# Patient Record
Sex: MALE | Race: WHITE | Employment: OTHER | URBAN - METROPOLITAN AREA
[De-identification: names, ages, dates, MRNs, and addresses within clinical notes are randomized per-mention and may not be internally consistent; named-entity substitution may affect disease eponyms.]

---

## 2023-11-28 ENCOUNTER — HOSPITAL ENCOUNTER (INPATIENT)
Age: 83
LOS: 2 days | Discharge: STILL ADMITTED OR ADMINISTRATIVE | DRG: 309 | End: 2023-11-30
Attending: EMERGENCY MEDICINE | Admitting: PSYCHIATRY & NEUROLOGY
Payer: MEDICARE

## 2023-11-28 DIAGNOSIS — R45.851 SUICIDAL IDEATION: Primary | ICD-10-CM

## 2023-11-28 DIAGNOSIS — I48.91 ATRIAL FIBRILLATION, UNSPECIFIED TYPE (HCC): ICD-10-CM

## 2023-11-28 PROBLEM — F32.A DEPRESSION, UNSPECIFIED: Status: ACTIVE | Noted: 2023-11-28

## 2023-11-28 LAB
AMPHETAMINES UR QL SCN>1000 NG/ML: NORMAL
ANION GAP SERPL CALCULATED.3IONS-SCNC: 12 MMOL/L (ref 3–16)
APAP SERPL-MCNC: <5 UG/ML (ref 10–30)
BARBITURATES UR QL SCN>200 NG/ML: NORMAL
BASOPHILS # BLD: 0.1 K/UL (ref 0–0.2)
BASOPHILS NFR BLD: 0.7 %
BENZODIAZ UR QL SCN>200 NG/ML: NORMAL
BUN SERPL-MCNC: 22 MG/DL (ref 7–20)
CALCIUM SERPL-MCNC: 9.3 MG/DL (ref 8.3–10.6)
CANNABINOIDS UR QL SCN>50 NG/ML: NORMAL
CHLORIDE SERPL-SCNC: 102 MMOL/L (ref 99–110)
CO2 SERPL-SCNC: 23 MMOL/L (ref 21–32)
COCAINE UR QL SCN: NORMAL
CREAT SERPL-MCNC: 1.3 MG/DL (ref 0.8–1.3)
DEPRECATED RDW RBC AUTO: 14.4 % (ref 12.4–15.4)
DRUG SCREEN COMMENT UR-IMP: NORMAL
EKG ATRIAL RATE: 119 BPM
EKG DIAGNOSIS: NORMAL
EKG Q-T INTERVAL: 322 MS
EKG QRS DURATION: 82 MS
EKG QTC CALCULATION (BAZETT): 427 MS
EKG R AXIS: -61 DEGREES
EKG T AXIS: 12 DEGREES
EKG VENTRICULAR RATE: 106 BPM
EOSINOPHIL # BLD: 0.1 K/UL (ref 0–0.6)
EOSINOPHIL NFR BLD: 1.4 %
ETHANOLAMINE SERPL-MCNC: NORMAL MG/DL (ref 0–0.08)
FENTANYL SCREEN, URINE: NORMAL
GFR SERPLBLD CREATININE-BSD FMLA CKD-EPI: 55 ML/MIN/{1.73_M2}
GLUCOSE SERPL-MCNC: 126 MG/DL (ref 70–99)
HCT VFR BLD AUTO: 48.1 % (ref 40.5–52.5)
HGB BLD-MCNC: 15.9 G/DL (ref 13.5–17.5)
INR PPP: 1.52 (ref 0.84–1.16)
LYMPHOCYTES # BLD: 1.6 K/UL (ref 1–5.1)
LYMPHOCYTES NFR BLD: 18.5 %
MAGNESIUM SERPL-MCNC: 1.8 MG/DL (ref 1.8–2.4)
MCH RBC QN AUTO: 31.4 PG (ref 26–34)
MCHC RBC AUTO-ENTMCNC: 33.1 G/DL (ref 31–36)
MCV RBC AUTO: 94.7 FL (ref 80–100)
METHADONE UR QL SCN>300 NG/ML: NORMAL
MONOCYTES # BLD: 1 K/UL (ref 0–1.3)
MONOCYTES NFR BLD: 11.2 %
NEUTROPHILS # BLD: 6 K/UL (ref 1.7–7.7)
NEUTROPHILS NFR BLD: 68.2 %
OPIATES UR QL SCN>300 NG/ML: NORMAL
OXYCODONE UR QL SCN: NORMAL
PCP UR QL SCN>25 NG/ML: NORMAL
PH UR STRIP: 5 [PH]
PLATELET # BLD AUTO: 213 K/UL (ref 135–450)
PMV BLD AUTO: 8.7 FL (ref 5–10.5)
POTASSIUM SERPL-SCNC: 3.4 MMOL/L (ref 3.5–5.1)
PROTHROMBIN TIME: 18.3 SEC (ref 11.5–14.8)
RBC # BLD AUTO: 5.08 M/UL (ref 4.2–5.9)
SALICYLATES SERPL-MCNC: <0.3 MG/DL (ref 15–30)
SODIUM SERPL-SCNC: 137 MMOL/L (ref 136–145)
WBC # BLD AUTO: 8.8 K/UL (ref 4–11)

## 2023-11-28 PROCEDURE — 80179 DRUG ASSAY SALICYLATE: CPT

## 2023-11-28 PROCEDURE — 93010 ELECTROCARDIOGRAM REPORT: CPT | Performed by: INTERNAL MEDICINE

## 2023-11-28 PROCEDURE — 82077 ASSAY SPEC XCP UR&BREATH IA: CPT

## 2023-11-28 PROCEDURE — 93005 ELECTROCARDIOGRAM TRACING: CPT | Performed by: EMERGENCY MEDICINE

## 2023-11-28 PROCEDURE — 99285 EMERGENCY DEPT VISIT HI MDM: CPT

## 2023-11-28 PROCEDURE — 6370000000 HC RX 637 (ALT 250 FOR IP): Performed by: PSYCHIATRY & NEUROLOGY

## 2023-11-28 PROCEDURE — 36415 COLL VENOUS BLD VENIPUNCTURE: CPT

## 2023-11-28 PROCEDURE — 83735 ASSAY OF MAGNESIUM: CPT

## 2023-11-28 PROCEDURE — 80143 DRUG ASSAY ACETAMINOPHEN: CPT

## 2023-11-28 PROCEDURE — 85025 COMPLETE CBC W/AUTO DIFF WBC: CPT

## 2023-11-28 PROCEDURE — 1240000000 HC EMOTIONAL WELLNESS R&B

## 2023-11-28 PROCEDURE — 93005 ELECTROCARDIOGRAM TRACING: CPT | Performed by: PSYCHIATRY & NEUROLOGY

## 2023-11-28 PROCEDURE — 80307 DRUG TEST PRSMV CHEM ANLYZR: CPT

## 2023-11-28 PROCEDURE — 80048 BASIC METABOLIC PNL TOTAL CA: CPT

## 2023-11-28 PROCEDURE — 85610 PROTHROMBIN TIME: CPT

## 2023-11-28 RX ORDER — ACETAMINOPHEN 325 MG/1
650 TABLET ORAL EVERY 8 HOURS PRN
Status: DISCONTINUED | OUTPATIENT
Start: 2023-11-28 | End: 2023-11-30 | Stop reason: HOSPADM

## 2023-11-28 RX ORDER — LISINOPRIL 20 MG/1
40 TABLET ORAL DAILY
Status: DISCONTINUED | OUTPATIENT
Start: 2023-11-29 | End: 2023-11-30 | Stop reason: HOSPADM

## 2023-11-28 RX ORDER — TRAZODONE HYDROCHLORIDE 50 MG/1
50 TABLET ORAL NIGHTLY PRN
Status: DISCONTINUED | OUTPATIENT
Start: 2023-11-28 | End: 2023-11-30 | Stop reason: HOSPADM

## 2023-11-28 RX ORDER — WARFARIN SODIUM 5 MG/1
5 TABLET ORAL
COMMUNITY

## 2023-11-28 RX ORDER — METOPROLOL SUCCINATE 25 MG/1
25 TABLET, EXTENDED RELEASE ORAL DAILY
Status: DISCONTINUED | OUTPATIENT
Start: 2023-11-28 | End: 2023-11-28 | Stop reason: ALTCHOICE

## 2023-11-28 RX ORDER — METOPROLOL SUCCINATE 25 MG/1
25 TABLET, EXTENDED RELEASE ORAL DAILY
Status: ON HOLD | COMMUNITY
End: 2023-11-28 | Stop reason: ALTCHOICE

## 2023-11-28 RX ORDER — POLYETHYLENE GLYCOL 3350 17 G
2 POWDER IN PACKET (EA) ORAL
Status: DISCONTINUED | OUTPATIENT
Start: 2023-11-28 | End: 2023-11-30 | Stop reason: HOSPADM

## 2023-11-28 RX ORDER — BENAZEPRIL HYDROCHLORIDE 40 MG/1
40 TABLET, FILM COATED ORAL DAILY
Status: ON HOLD | COMMUNITY
End: 2023-12-01 | Stop reason: SDUPTHER

## 2023-11-28 RX ORDER — HYDROCHLOROTHIAZIDE 25 MG/1
25 TABLET ORAL DAILY
Status: ON HOLD | COMMUNITY
End: 2023-12-01 | Stop reason: HOSPADM

## 2023-11-28 RX ORDER — WARFARIN SODIUM 2.5 MG/1
7.5 TABLET ORAL
Status: COMPLETED | OUTPATIENT
Start: 2023-11-28 | End: 2023-11-29

## 2023-11-28 RX ORDER — WARFARIN SODIUM 2.5 MG/1
5 TABLET ORAL DAILY
Status: DISCONTINUED | OUTPATIENT
Start: 2023-11-28 | End: 2023-11-28 | Stop reason: ALTCHOICE

## 2023-11-28 RX ORDER — HYDROCHLOROTHIAZIDE 25 MG/1
25 TABLET ORAL DAILY
Status: DISCONTINUED | OUTPATIENT
Start: 2023-11-29 | End: 2023-11-30 | Stop reason: HOSPADM

## 2023-11-28 RX ORDER — HYDROXYZINE HYDROCHLORIDE 25 MG/1
25 TABLET, FILM COATED ORAL 3 TIMES DAILY PRN
Status: DISCONTINUED | OUTPATIENT
Start: 2023-11-28 | End: 2023-11-30 | Stop reason: HOSPADM

## 2023-11-28 RX ADMIN — METOPROLOL TARTRATE 25 MG: 25 TABLET, FILM COATED ORAL at 22:53

## 2023-11-28 ASSESSMENT — LIFESTYLE VARIABLES
HOW OFTEN DO YOU HAVE A DRINK CONTAINING ALCOHOL: NEVER
HOW MANY STANDARD DRINKS CONTAINING ALCOHOL DO YOU HAVE ON A TYPICAL DAY: PATIENT DOES NOT DRINK

## 2023-11-28 ASSESSMENT — PATIENT HEALTH QUESTIONNAIRE - PHQ9
10. IF YOU CHECKED OFF ANY PROBLEMS, HOW DIFFICULT HAVE THESE PROBLEMS MADE IT FOR YOU TO DO YOUR WORK, TAKE CARE OF THINGS AT HOME, OR GET ALONG WITH OTHER PEOPLE: 2
5. POOR APPETITE OR OVEREATING: 3
1. LITTLE INTEREST OR PLEASURE IN DOING THINGS: 2
9. THOUGHTS THAT YOU WOULD BE BETTER OFF DEAD, OR OF HURTING YOURSELF: 1
SUM OF ALL RESPONSES TO PHQ9 QUESTIONS 1 & 2: 4
SUM OF ALL RESPONSES TO PHQ QUESTIONS 1-9: 10
SUM OF ALL RESPONSES TO PHQ QUESTIONS 1-9: 11
SUM OF ALL RESPONSES TO PHQ QUESTIONS 1-9: 11
3. TROUBLE FALLING OR STAYING ASLEEP: 0
6. FEELING BAD ABOUT YOURSELF - OR THAT YOU ARE A FAILURE OR HAVE LET YOURSELF OR YOUR FAMILY DOWN: 3
2. FEELING DOWN, DEPRESSED OR HOPELESS: 2
8. MOVING OR SPEAKING SO SLOWLY THAT OTHER PEOPLE COULD HAVE NOTICED. OR THE OPPOSITE, BEING SO FIGETY OR RESTLESS THAT YOU HAVE BEEN MOVING AROUND A LOT MORE THAN USUAL: 0
SUM OF ALL RESPONSES TO PHQ QUESTIONS 1-9: 11
7. TROUBLE CONCENTRATING ON THINGS, SUCH AS READING THE NEWSPAPER OR WATCHING TELEVISION: 0
4. FEELING TIRED OR HAVING LITTLE ENERGY: 0

## 2023-11-28 ASSESSMENT — SLEEP AND FATIGUE QUESTIONNAIRES
DO YOU USE A SLEEP AID: YES
SLEEP PATTERN: DIFFICULTY FALLING ASLEEP
DO YOU HAVE DIFFICULTY SLEEPING: NO
DO YOU HAVE DIFFICULTY SLEEPING: NO
AVERAGE NUMBER OF SLEEP HOURS: 9
DO YOU USE A SLEEP AID: NO
AVERAGE NUMBER OF SLEEP HOURS: 10

## 2023-11-28 ASSESSMENT — PAIN - FUNCTIONAL ASSESSMENT: PAIN_FUNCTIONAL_ASSESSMENT: NONE - DENIES PAIN

## 2023-11-28 NOTE — ED PROVIDER NOTES
[facility] Emergency Department      CHIEF COMPLAINT  Suicidal (Patient arrives via police for statements of self harm, he left his residence after an argument with his grandson. Patient apologetic and calm during triage, states he is sorry for causing any trouble. Drove to [place] and wanted to be with his dead spouse, no longer having those thoughts. Corey any drugs/alcohol or medical complaints.)      HISTORY OF PRESENT ILLNESS  Nadia Jenkins is a 82 y.o. male with a history of hypertension and atrial fibrillation presents after he left his house on the  and started driving. He states that he is feeling very depressed. His wife  6 years ago and he states he is \"still not over it\". He states he is out of money and is very stressed out. His daughter and grandson live with him. He states he left his house and forgot his cell phone and decided to just start driving. He felt like if he just left and never came back that would solve all of his problems. He was having thoughts of suicide and thought about just driving into the river. He drove around for 5 days and slept in his car paying for gas with just cash. His family actually issued a missing person report. He states he realized he had made a poor decision and was running out of money so he decided to drive home today. When he walked into his house his family immediately called 911 for him to be brought to the ER. Fatuma Cook No other complaints, modifying factors or associated symptoms. History obtained from the.    I have reviewed the following from the nursing documentation. Past Medical History:   Diagnosis Date    Cancer     Hypertension      Past Surgical History:   Procedure Laterality Date    SHOULDER SURGERY      TOTAL HIP ARTHROPLASTY       No family history on file. Social History     Socioeconomic History    Marital status:       Spouse name: Not on file    Number of children: Not on file    Years of education:

## 2023-11-28 NOTE — ED NOTES
Patient in bed with eyes closed, respirations are easy and even. Sitter at bedside. Will continue to monitor.      Layla Yates RN  11/28/23 [id number]

## 2023-11-28 NOTE — BH NOTE
Pt arrived to the unit via w/c accompanied by security and staff. Pt came in gown only. Personal belongings and SOB given to staff.

## 2023-11-28 NOTE — ED NOTES
Collateral Contact:  Name:Sandeep Kahn Last   PIKWT:[phone]  Relation to Patient: grandson   Last Contact with Patient: today 2023  Concerns: Vannessa Calderon reports pt says he wants to die all of the time. He reports since his grandma  in  pt has not been the same. He reports he and his mom have been with pt since his grandma . He reports pt can be hateful and takes it out on them. He reports he and pt do bicker about money and will argue. He reports pt is under a lot of pressure financially. He reports the way pt acts and the things he says has been getting hard on the family. He reports his mom is disabled as well. He reports pt does not want to live without his wife. He reports around halftime of the [employer] game on  pt left and before he left heard him say \" Can't find my phone, oh well fuck it\". He reports he thought pt would be back shortly after since he usually will leave to go to the race track or to the cemetery and visit his wife and family. He reports it has been a few hours on  and noticed he had not returned and started getting worried. He reports a friend took him up to the police station around 9:55/7:12 pm on  night and filed a missing person's report. He reports he is not sure how much an in-pt admission would help pt. He reports pt does not open up and states he would only give about 60 %. He reports pt will say what the doctors want to hear so he can be discharged. He reports pt will often get angry before he talks to someone. He reports he believes pt left on an impulse. He reports pt has not been the same since his grandma  and states she was the glue that held everyone together. Vannessa Calderon reports he will remove the 22 rifle. He reports he can come get pt if he is discharged.          [facility] [place]

## 2023-11-28 NOTE — ED NOTES
Presenting Problem: Patient presents to ED on a SOB after pt was brought in by police. Per SOB police responded to pt's place of residence in response to a missing person. Per SOB pt had made suicidal statements and told the  he wanted to drive his car into a river. Pt later recanted his statement to police and stated he no linger felt this way. Pt was in possession of a fire arm while he missing but told police officers he threw it into a river while traveling. Pt reports he went out  morning to get money from the [employer] at the [employer] and was going to go home. Pt states while he was out he had a spur of the moment thought and decided to drive off to find a place to kill himself with the firearm. Pt reports he drove all over and ended up in [place] as well. Pt reports he has been depressed since his wife  six years ago. He reports he has not been the same since. He reports  he has financial stress and states he will always help others but no one will help him. He reports he feels he is drowning in debt. He reports his daughter and grandson live with him and states his daughter is paralyzed. He reports he thought by killing himself all of his problems would go away and be solved. He reports he has nothing worth living for and does not enjoy anything he used to do. He reports he does not do much of anything anymore beside go to [employer] and the grocery store. He reports he also left his cell phone at the house when he left. He reports he was being selfish and does not plan on acting on these thoughts again. He reports he does want to die because he wants to be with his wife. He denies a plan or intent. Pt states he will let nature takes it course. He reports he does not want to live long and is surprised he has lived for this long after she has . He reports he did have a firearm with him and was also driving around looking for a place to die.  He reports he could not pull the trigger though. He reports he threw it into a river before he drove back to his house. He reports he is afraid of pain and thinks that is another reason he did not go through with it. He reports at a rest stop he thought he saw his wife sitting in the passenger seat in the car but states when he got in she was not there. Pt denies homicidal ideations. Pt denies audio/visual hallucinations. Appearance/Hygiene:  well-appearing, hospital attire, lying in bed, good grooming, and good hygiene   Motor Behavior: WNL   Attitude: cooperative  Affect: depressed affect   Speech: normal pitch and normal volume  Mood: depressed and within normal limits   Thought Processes: [place]  Perceptions: Absent   Thought content: hopelessness, tangential   Orientation: A&Ox4   Memory: intact  Concentration: Good    Insight/ judgement: impaired judgment and insight       Psychosocial and contextual factors: Pt lives in his own home with his daughter and grandson. Pt's daughter is paralyzed. Pt reports financial stress. Pt reports he has arthritis all over and in his shoulders. Pt reports he is also on blood pressure medicine and blood thinners. He reports his appetite has decreased. He reports his sleep is alright due to the pain medicine he takes at night for his shoulders. He reports if he wakes up in the middle of the night it is hard for him to fall asleep. He reports he has not slept in the last three days due to driving and sleeping in his car. He reports he is a loner and does not like to be around people. Pt reports he does not open up much or talk about his feelings. Pt reports he feels he does not have a support system. C-SSRS flowsheet is  Complete. Psychiatric History (including current outpatient provider and past inpatient admissions): Pt reports he has not been admitted for psych in the past. Pt states he has no out pt mental health services.  He reports he has taken multiple of his pain medication at night in

## 2023-11-28 NOTE — ED NOTES
Reviewed medication with patient, he states that is all he takes but does take a night time medication that he can not remember the name.      Milagro Saha RN  11/28/23 4073

## 2023-11-28 NOTE — ED NOTES
Level of Care Disposition: admit     Patient was seen by ED provider and Nursing/SW staff. The case presented to psychiatric provider on-call . Based on the ED evaluation and information presented to the provider by this writer it is the recommendation of the on call psychiatric provider that inpatient hospitalization is the least restrictive environment for the patient at this time. The patient will be admitted to the inpatient unit.              Troy CASTRO

## 2023-11-28 NOTE — ED NOTES
Writer attempted to call pt's grandson Leonrado Larry for collateral information at [phone]. He did not answer, writer left voice message with number to call back.          Darrell CASTRO

## 2023-11-28 NOTE — ED NOTES
Pt brought back to zone B. Pt changed out into safety gown and belongings placed in locker. Pt oriented to room B2.          Marika CASTRO

## 2023-11-28 NOTE — ED NOTES
Patient resting in bed, sitter at bedside. No needs at this time. Will continue to monitor.      Shelley Martinez RN  11/28/23 1002 [place]       Shelley Martinez RN  11/28/23 1960

## 2023-11-29 PROBLEM — I10 ESSENTIAL HYPERTENSION: Status: ACTIVE | Noted: 2023-11-29

## 2023-11-29 PROBLEM — I48.91 ATRIAL FIBRILLATION WITH RVR (HCC): Status: ACTIVE | Noted: 2023-11-29

## 2023-11-29 PROBLEM — R79.1 SUBTHERAPEUTIC INTERNATIONAL NORMALIZED RATIO (INR): Status: ACTIVE | Noted: 2023-11-29

## 2023-11-29 PROBLEM — N18.30 CKD (CHRONIC KIDNEY DISEASE) STAGE 3, GFR 30-59 ML/MIN (HCC): Status: ACTIVE | Noted: 2022-10-31

## 2023-11-29 PROBLEM — R45.851 SUICIDAL IDEATION: Status: ACTIVE | Noted: 2023-11-29

## 2023-11-29 PROBLEM — I48.21 PERMANENT ATRIAL FIBRILLATION (HCC): Status: ACTIVE | Noted: 2017-04-05

## 2023-11-29 LAB
DEPRECATED RDW RBC AUTO: 14.3 % (ref 12.4–15.4)
EKG ATRIAL RATE: 101 BPM
EKG ATRIAL RATE: 159 BPM
EKG DIAGNOSIS: NORMAL
EKG DIAGNOSIS: NORMAL
EKG Q-T INTERVAL: 278 MS
EKG Q-T INTERVAL: 360 MS
EKG QRS DURATION: 84 MS
EKG QRS DURATION: 94 MS
EKG QTC CALCULATION (BAZETT): 392 MS
EKG QTC CALCULATION (BAZETT): 435 MS
EKG R AXIS: -67 DEGREES
EKG R AXIS: -74 DEGREES
EKG T AXIS: -21 DEGREES
EKG T AXIS: 52 DEGREES
EKG VENTRICULAR RATE: 120 BPM
EKG VENTRICULAR RATE: 88 BPM
HCT VFR BLD AUTO: 48.3 % (ref 40.5–52.5)
HGB BLD-MCNC: 16.1 G/DL (ref 13.5–17.5)
INR PPP: 1.55 (ref 0.84–1.16)
MAGNESIUM SERPL-MCNC: 1.7 MG/DL (ref 1.8–2.4)
MCH RBC QN AUTO: 31.4 PG (ref 26–34)
MCHC RBC AUTO-ENTMCNC: 33.3 G/DL (ref 31–36)
MCV RBC AUTO: 94.3 FL (ref 80–100)
PLATELET # BLD AUTO: 203 K/UL (ref 135–450)
PMV BLD AUTO: 8.8 FL (ref 5–10.5)
POTASSIUM SERPL-SCNC: 3.5 MMOL/L (ref 3.5–5.1)
PROTHROMBIN TIME: 18.5 SEC (ref 11.5–14.8)
RBC # BLD AUTO: 5.12 M/UL (ref 4.2–5.9)
WBC # BLD AUTO: 9.8 K/UL (ref 4–11)

## 2023-11-29 PROCEDURE — 85027 COMPLETE CBC AUTOMATED: CPT

## 2023-11-29 PROCEDURE — 6360000002 HC RX W HCPCS: Performed by: REGISTERED NURSE

## 2023-11-29 PROCEDURE — 84132 ASSAY OF SERUM POTASSIUM: CPT

## 2023-11-29 PROCEDURE — 6370000000 HC RX 637 (ALT 250 FOR IP): Performed by: NURSE PRACTITIONER

## 2023-11-29 PROCEDURE — 1240000000 HC EMOTIONAL WELLNESS R&B

## 2023-11-29 PROCEDURE — 6370000000 HC RX 637 (ALT 250 FOR IP): Performed by: PSYCHIATRY & NEUROLOGY

## 2023-11-29 PROCEDURE — 99223 1ST HOSP IP/OBS HIGH 75: CPT

## 2023-11-29 PROCEDURE — 93010 ELECTROCARDIOGRAM REPORT: CPT | Performed by: INTERNAL MEDICINE

## 2023-11-29 PROCEDURE — 85610 PROTHROMBIN TIME: CPT

## 2023-11-29 PROCEDURE — 6370000000 HC RX 637 (ALT 250 FOR IP)

## 2023-11-29 PROCEDURE — 83735 ASSAY OF MAGNESIUM: CPT

## 2023-11-29 PROCEDURE — 36415 COLL VENOUS BLD VENIPUNCTURE: CPT

## 2023-11-29 PROCEDURE — 93005 ELECTROCARDIOGRAM TRACING: CPT

## 2023-11-29 PROCEDURE — 99222 1ST HOSP IP/OBS MODERATE 55: CPT | Performed by: NURSE PRACTITIONER

## 2023-11-29 RX ORDER — ENOXAPARIN SODIUM 100 MG/ML
1 INJECTION SUBCUTANEOUS 2 TIMES DAILY
Status: DISCONTINUED | OUTPATIENT
Start: 2023-11-29 | End: 2023-11-30 | Stop reason: ALTCHOICE

## 2023-11-29 RX ORDER — LANOLIN ALCOHOL/MO/W.PET/CERES
400 CREAM (GRAM) TOPICAL DAILY
Status: COMPLETED | OUTPATIENT
Start: 2023-11-29 | End: 2023-11-30

## 2023-11-29 RX ORDER — WARFARIN SODIUM 2.5 MG/1
7.5 TABLET ORAL
Status: COMPLETED | OUTPATIENT
Start: 2023-11-29 | End: 2023-11-29

## 2023-11-29 RX ADMIN — HYDROCHLOROTHIAZIDE 25 MG: 25 TABLET ORAL at 09:31

## 2023-11-29 RX ADMIN — ENOXAPARIN SODIUM 80 MG: 100 INJECTION SUBCUTANEOUS at 09:32

## 2023-11-29 RX ADMIN — METOPROLOL TARTRATE 25 MG: 25 TABLET, FILM COATED ORAL at 22:07

## 2023-11-29 RX ADMIN — ACETAMINOPHEN 650 MG: 325 TABLET ORAL at 20:32

## 2023-11-29 RX ADMIN — CAMPHOR (SYNTHETIC), MENTHOL, UNSPECIFIED FORM, AND METHYL SALICYLATE: 40; 100; 300 CREAM TOPICAL at 14:42

## 2023-11-29 RX ADMIN — LISINOPRIL 40 MG: 20 TABLET ORAL at 09:31

## 2023-11-29 RX ADMIN — ENOXAPARIN SODIUM 80 MG: 100 INJECTION SUBCUTANEOUS at 00:49

## 2023-11-29 RX ADMIN — Medication 400 MG: at 13:51

## 2023-11-29 RX ADMIN — WARFARIN SODIUM 7.5 MG: 2.5 TABLET ORAL at 00:23

## 2023-11-29 RX ADMIN — ENOXAPARIN SODIUM 80 MG: 100 INJECTION SUBCUTANEOUS at 22:11

## 2023-11-29 RX ADMIN — METOPROLOL TARTRATE 25 MG: 25 TABLET, FILM COATED ORAL at 09:31

## 2023-11-29 RX ADMIN — WARFARIN SODIUM 7.5 MG: 2.5 TABLET ORAL at 17:54

## 2023-11-29 ASSESSMENT — PAIN DESCRIPTION - ORIENTATION: ORIENTATION: OUTER

## 2023-11-29 ASSESSMENT — PAIN DESCRIPTION - DESCRIPTORS: DESCRIPTORS: SHARP

## 2023-11-29 ASSESSMENT — PAIN DESCRIPTION - LOCATION: LOCATION: ELBOW

## 2023-11-29 NOTE — CONSULTS
Pharmacy Note  Warfarin Consult  Dx: Afib  [id number] Score = 3 (Age x2, HTN)  Goal INR range 2-3   Home Warfarin dose: 5 mg on Mon/Wed/Fri; 2.5 mg on Sun/Tues/Thur/Sat  Also, will begin Lovenox bridge until therapeutic for >2 days per Andrei Barreto NP. Date  INR  Warfarin  11/28              1.52                  7.5 mg    Recommend Warfarin 7.5 mg tonight x1. Daily INR ordered. Rx will continue to manage therapy per consult order.

## 2023-11-29 NOTE — CARE COORDINATION
Psychosocial, PTSD screen, and CSSR - S completed by VANE Wilkerson      Pt went to pull money from [employer] and then decided all his problems would go away if he ended his life. Pt decided to leave and planned to end life but could decide how to do it. Patient states since his wife  he has just wanted to die. Patient he still has thoughts of wanting to die and that the idea of it brings him peace and comfort. No hx of mental health hx. PCP did connect patient to a therapist when wife past away in 2017 but pt did not want to engage. Daughter and grandson live with patient, but they don't always provide the support pt feels he needs. 23 1851   Psychiatric History   Psychiatric history treatment Other  (No hx of mental health hx. PCP did connect patient to a therapist when wife past away in 2017 but pt did not want to engage.)   Are there any medication issues? Yes   Recent Psychological Experiences Other(comment); Turmoil (comment)  (Pt went to pull money from [employer] and then decided all his problems would go away if he ended his life. Pt decided to leave and planned to end life but could decide how to do it.  Patient states since his wife  he has just wanted to die.)   Support System   Support system Adequate   Types of Support System Other (Comment)  (Daughter and grandson live with patient, but they don't always provide the support pt feels he needs.)   Problems in support system Lack of friends/family   Current Living Situation   Home Living Adequate   Living information Lives with others  (Lives with daughter and grandson)   Problems with living situation  No   Lack of basic needs No   SSDI/SSI SSI $5909   Other government assistance Medicare   Problems with environment None reported   Supervised setting None   Relationship problems Yes   Relationship problems due to  Death of spouse/partner  (Patient reports that he is gireving the loss of his wife, who passed in 2017)   Medical and

## 2023-11-29 NOTE — PLAN OF CARE
Problem: Risk for Elopement  Goal: Patient will not exit the unit/facility without proper excort  Outcome: Progressing  Flowsheets (Taken 11/28/2023 2023)  Nursing Interventions for Elopement Risk:   Assist with personal care needs such as toileting, eating, dressing, as needed to reduce the risk of wandering   Collaborate with family members/caregivers to mitigate the elopement risk   Communicate/escalate to charge nurse the risk of elopement   Collaborate with treatment team for nicotine replacement   Communicate/escalate to /other team member the risk of elopement   Make sure patient has all necessary personal care items   Place patient in room far away from exits and stairways     Problem: Self Harm/Suicidality  Goal: Will have no self-injury during hospital stay  Description: INTERVENTIONS:  1. Ensure constant observer at bedside with Q15M safety checks  2. Maintain a safe environment  3. Secure patient belongings  4. Ensure family/visitors adhere to safety recommendations  5. Ensure safety tray has been added to patient's diet order  6. Every shift and PRN: Re-assess suicidal risk via Frequent Screener    Outcome: Progressing     Problem: ABCDS Injury Assessment  Goal: Absence of physical injury  Outcome: Progressing     Problem: Coping  Goal: Pt/Family able to verbalize concerns and demonstrate effective coping strategies  Description: INTERVENTIONS:  1. Assist patient/family to identify coping skills, available support systems and cultural and spiritual values  2. Provide emotional support, including active listening and acknowledgement of concerns of patient and caregivers  3. Reduce environmental stimuli, as able  4. Instruct patient/family in relaxation techniques, as appropriate  5.  Assess for spiritual pain/suffering and initiate Spiritual Care, Psychosocial Clinical Specialist consults as needed  Outcome: Progressing     Problem: Decision Making  Goal: Pt/Family able to effectively

## 2023-11-29 NOTE — CONSULTS
Pharmacy consulted to dose enoxaparin as a bridge to warfarin until INR therapeutic for > 2 days (pharmacy to d/c when appropriate). VTE Prophylaxis Monitoring    Recent Labs     11/28/23  1320   CREATININE 1.3     Recent Labs     11/28/23  1320 11/28/23  2302   HGB 15.9  --    HCT 48.1  --      --    INR  --  1.52*     Estimated Creatinine Clearance: 44 mL/min (based on SCr of 1.3 mg/dL). Wt Readings from Last 1 Encounters:   11/29/23 78.6 kg (173 lb 4.5 oz)       The guidance below is to provide initial recommendations for dosing. If recommended dose does not align well with patient's current clinical picture, communications with the care team will occur to determine most appropriate medication and dose. TABLE 2. ENOXAPARIN TREATMENT DOSING   (Based on 1mg/kg BID for DVT/PE/AFib)   Patient Weight (kg)     50.9 and below .9 151-189.9 190 or greater         Estimated CrCl  (ml/min) 30 or greater Recommend [facility] UFH infusion, apixaban or rivaroxaban 1mg/kg SUBQ BID 1mg/kg SUBQ BID if anti-Xa levels are feasible per institution. Alternatively,  recommend switch to [facility] standardized UFH infusion     Recommend switch to [facility] standardized UFH infusion. 15-29 Recommend [facility] standardized UFH infusion or apixaban 1mg/kg SUBQ daily Recommend switch to [facility] standardized UFH infusion     Less than 15 or Dialysis Recommend switch to [facility] standardized UFH infusion.      [facility] 11/29/2023 12:32 AM

## 2023-11-29 NOTE — PLAN OF CARE
Problem: Risk for Elopement  Goal: Patient will not exit the unit/facility without proper excort  11/29/2023 1001 by Fuentes Del Rosario RN  Outcome: Progressing     Problem: Self Harm/Suicidality  Goal: Will have no self-injury during hospital stay  Description: INTERVENTIONS:  1. Ensure constant observer at bedside with Q15M safety checks  2. Maintain a safe environment  3. Secure patient belongings  4. Ensure family/visitors adhere to safety recommendations  5. Ensure safety tray has been added to patient's diet order  6. Every shift and PRN: Re-assess suicidal risk via Frequent Screener    11/29/2023 1001 by Fuentes Del Rosario RN  Outcome: Progressing    Pt has been mostly isolative to his room. He denies wanting to harm himself on the unit. He has been medication compliant. He does not want to be here and does not feel like he should be admitted on a psychiatric unit. He does feel like he has the, \"right to die. \" He reports that he has been unhappy since his wife passed six years ago. When asked if he had a plan to harm himself if discharged, pt denies. He said he will go back to being, \"miserable. \" Pt reports that he goes to [employer] weekly but does not do any other activities. Pt encouraged to come out into the day room but he declined.  Denies needs currently

## 2023-11-29 NOTE — PROGRESS NOTES
4 Eyes Skin Assessment     The patient is being assessed for  Admission    I agree that 2 RN's have performed a thorough Head to Toe Skin Assessment on the patient. ALL assessment sites listed below have been assessed. Areas assessed for pressure by both nurses: 2  [x]   Head, Face, and Ears   [x]   Shoulders, Back, and Chest  [x]   Arms, Elbows, and Hands   [x]   Coccyx, Sacrum, and Ischum  [x]   Legs, Feet, and Heels                                Skin Assessed Under all Medical Devices by both nurses:  No medical devices present. All Mepilex Borders were peeled back and area peeked at by both nurses:  No: None present. Please list where Mepilex Borders are located:  No Mepilex Borders in place. Does the Patient have Skin Breakdown related to pressure?   No          Fede Prevention initiated:  No   Wound Care Orders initiated:  No      [facility] nurse consulted for Pressure Injury (Stage 3,4, Unstageable, DTI, NWPT, Complex wounds)and New or Established Ostomies:  NA        Nurse 1 eSignature: Electronically signed by Samson Saravia RN on 11/29/23 at 1:08 AM EST    **SHARE this note so that the co-signing nurse is able to place an eSignature**    Nurse 2 eSignature: Electronically signed by Antoine Weathers LPN on 76/88/57 at 1:48 AM EST

## 2023-11-29 NOTE — PROGRESS NOTES
CSSR-S Assessment completed with patient who then scored HIGH RISK. Provider Dr. Mahendra Reno was notified of HIGH RISK score, via telephone at 21:20 on 11/28/23. Were suicide precautions ordered: NO    If not ordered, justification as follows: Patient denies current suicidal thoughts, a plan, intent or means. Feels safe on unit. Agrees to and understands safety plan and agrees to make caregivers aware of any changes. Completed by: Alysha Sorto.  Shanda GAY

## 2023-11-29 NOTE — H&P
INITIAL PSYCHIATRIC HISTORY AND PHYSICAL      Patient name: Celina Zamora  Admit date: 2023  Today's date: 2023           CC:  Depression    HPI:   Patient seen in room on Adult Behavioral Unit. Patient is a 82 y.o. male who presents  to [facility] ED on a SOB after pt was brought in by police. Per SW notes: \"Per SOB police responded to pt's place of residence in response to a missing person. Per SOB pt had made suicidal statements and told the  he wanted to drive his car into a river. Pt later recanted his statement to police and stated he no linger felt this way. Pt was in possession of a fire arm while he missing but told police officers he threw it into a river while traveling. Pt reports he went out  morning to get money from the [employer] at [place] and was going to go home. Pt states while he was out he had a spur of the moment thought and decided to drive off to find a place to kill himself with the firearm. Pt reports he drove all over and ended up in [place] as well. Pt reports he has been depressed since his wife  six years ago. He reports he has not been the same since. He reports  he has financial stress and states he will always help others but no one will help him. He reports he feels he is drowning in debt. He reports his daughter and grandson live with him and states his daughter is paralyzed. He reports he thought by killing himself all of his problems would go away and be solved. He reports he has nothing worth living for and does not enjoy anything he used to do. He reports he does not do much of anything anymore beside go to [employer] and the grocery store. He reports he also left his cell phone at the house when he left. He reports he was being selfish and does not plan on acting on these thoughts again. He reports he does want to die because he wants to be with his wife. He denies a plan or intent. Pt states he will let nature takes it course.  He reports medication risks, benefits, side effects. Obtained informed consent for treatment.      Li Mcqueen, PMHNP-BC

## 2023-11-29 NOTE — PROGRESS NOTES
Behavioral Services  Medicare Certification Upon Admission    I certify that this patient's inpatient psychiatric hospital admission is medically necessary for:    [x] (1) Treatment which could reasonably be expected to improve this patient's condition,       [x] (2) Or for diagnostic study;     AND     [x](2) The inpatient psychiatric services are provided while the individual is under the care of a physician and are included in the individualized plan of care.     Estimated length of stay/service 2-5 days    Plan for post-hospital care outpatient services    Electronically signed by BRENDA Cantrell CNP on 11/29/2023 at 1:53 PM

## 2023-11-29 NOTE — PROGRESS NOTES
Pt alert and oriented x 4. Pleasant, appropriate, and cooperative with assessment and care. Patient scores as High risk Per C-SSRS screening. Pt denies an intent, a plan, or means. States regrets of leaving home and describes self aborted suicidal attempts such as driving into a river or off a mountain, and use of a gun that he \"threw in a river\". Denies current SI/HI, AH/VH. Dr. Seth Morocho aware of such and suicidal orders discontinued. Pt is cooperative,independent of ADLs. Gait steady.

## 2023-11-29 NOTE — H&P
Hospital Medicine History & Physical      PCP: Josh Todd MD    Date of Admission: 11/28/2023    Date of Service: Pt seen/examined on 11/29/23     Chief Complaint:    Chief Complaint   Patient presents with    Suicidal     Patient arrives via police for statements of self harm, he left his residence after an argument with his grandson. Patient apologetic and calm during triage, states he is sorry for causing any trouble. Drove to [place] and wanted to be with his dead spouse, no longer having those thoughts. Corey any drugs/alcohol or medical complaints. History Of Present Illness: The patient is a 82 y.o. male with pmhx as below who presented to [facility] ED for suicidal ideation. Patient was seen and evaluated in the ED by the ED medical provider, patient was medically cleared for admission to [facility]. This note serves as an admission medical H&P. Tobacco use: denies  ETOH use: denies  Illicit drug use: denies    Patient denies any medical complaints     Past Medical History:        Diagnosis Date    A-fib ([address])     CKD (chronic kidney disease)     HLD (hyperlipidemia)     Hypertension     Osteoarthritis     Skin cancer        Past Surgical History:        Procedure Laterality Date    SHOULDER SURGERY      TOTAL HIP ARTHROPLASTY         Medications Prior to Admission:    Prior to Admission medications    Medication Sig Start Date End Date Taking? Authorizing Provider   benazepril (LOTENSIN) 40 MG tablet Take 1 tablet by mouth daily   Yes Victor M Craven MD   hydroCHLOROthiazide (HYDRODIURIL) 25 MG tablet Take 1 tablet by mouth daily   Yes Victor M Craven MD   warfarin (COUMADIN) 5 MG tablet Take 1 tablet by mouth Patient takes 5mg on Mon, Wed and Friday.  Half pill other days   Yes Victor M Craven MD   metoprolol tartrate (LOPRESSOR) 25 MG tablet Take 1 tablet by mouth 2 times daily   Yes Victor M Craven MD       Allergies:  Penicillins    Social

## 2023-11-30 ENCOUNTER — HOSPITAL ENCOUNTER (INPATIENT)
Age: 83
LOS: 1 days | Discharge: HOME | DRG: 309 | End: 2023-12-01
Attending: INTERNAL MEDICINE | Admitting: INTERNAL MEDICINE
Payer: MEDICARE

## 2023-11-30 VITALS
DIASTOLIC BLOOD PRESSURE: 55 MMHG | BODY MASS INDEX: 27.85 KG/M2 | HEIGHT: 66 IN | OXYGEN SATURATION: 98 % | TEMPERATURE: 98.6 F | RESPIRATION RATE: 16 BRPM | HEART RATE: 126 BPM | SYSTOLIC BLOOD PRESSURE: 92 MMHG | WEIGHT: 173.28 LBS

## 2023-11-30 PROBLEM — I10 PRIMARY HYPERTENSION: Status: ACTIVE | Noted: 2023-11-30

## 2023-11-30 LAB
ANION GAP SERPL CALCULATED.3IONS-SCNC: 11 MMOL/L (ref 3–16)
BASOPHILS # BLD: 0 K/UL (ref 0–0.2)
BASOPHILS NFR BLD: 0.4 %
BUN SERPL-MCNC: 23 MG/DL (ref 7–20)
CALCIUM SERPL-MCNC: 9 MG/DL (ref 8.3–10.6)
CHLORIDE SERPL-SCNC: 98 MMOL/L (ref 99–110)
CO2 SERPL-SCNC: 24 MMOL/L (ref 21–32)
CREAT SERPL-MCNC: 1.2 MG/DL (ref 0.8–1.3)
DEPRECATED RDW RBC AUTO: 14 % (ref 12.4–15.4)
DEPRECATED RDW RBC AUTO: 14.4 % (ref 12.4–15.4)
EKG ATRIAL RATE: 90 BPM
EKG DIAGNOSIS: NORMAL
EKG Q-T INTERVAL: 340 MS
EKG QRS DURATION: 86 MS
EKG QTC CALCULATION (BAZETT): 413 MS
EKG R AXIS: -49 DEGREES
EKG T AXIS: -13 DEGREES
EKG VENTRICULAR RATE: 89 BPM
EOSINOPHIL # BLD: 0.1 K/UL (ref 0–0.6)
EOSINOPHIL NFR BLD: 0.7 %
GFR SERPLBLD CREATININE-BSD FMLA CKD-EPI: >60 ML/MIN/{1.73_M2}
GLUCOSE SERPL-MCNC: 118 MG/DL (ref 70–99)
HCT VFR BLD AUTO: 42.8 % (ref 40.5–52.5)
HCT VFR BLD AUTO: 44.3 % (ref 40.5–52.5)
HGB BLD-MCNC: 14.6 G/DL (ref 13.5–17.5)
HGB BLD-MCNC: 14.8 G/DL (ref 13.5–17.5)
INR PPP: 2.15 (ref 0.84–1.16)
LYMPHOCYTES # BLD: 1.6 K/UL (ref 1–5.1)
LYMPHOCYTES NFR BLD: 14.8 %
MAGNESIUM SERPL-MCNC: 1.8 MG/DL (ref 1.8–2.4)
MCH RBC QN AUTO: 32.1 PG (ref 26–34)
MCH RBC QN AUTO: 32.4 PG (ref 26–34)
MCHC RBC AUTO-ENTMCNC: 33.5 G/DL (ref 31–36)
MCHC RBC AUTO-ENTMCNC: 34.2 G/DL (ref 31–36)
MCV RBC AUTO: 95 FL (ref 80–100)
MCV RBC AUTO: 95.7 FL (ref 80–100)
MONOCYTES # BLD: 1.5 K/UL (ref 0–1.3)
MONOCYTES NFR BLD: 13.3 %
NEUTROPHILS # BLD: 7.7 K/UL (ref 1.7–7.7)
NEUTROPHILS NFR BLD: 70.8 %
PLATELET # BLD AUTO: 169 K/UL (ref 135–450)
PLATELET # BLD AUTO: 191 K/UL (ref 135–450)
PMV BLD AUTO: 8.7 FL (ref 5–10.5)
PMV BLD AUTO: 8.7 FL (ref 5–10.5)
POTASSIUM SERPL-SCNC: 3.5 MMOL/L (ref 3.5–5.1)
PROTHROMBIN TIME: 23.9 SEC (ref 11.5–14.8)
RBC # BLD AUTO: 4.51 M/UL (ref 4.2–5.9)
RBC # BLD AUTO: 4.63 M/UL (ref 4.2–5.9)
SODIUM SERPL-SCNC: 133 MMOL/L (ref 136–145)
TROPONIN, HIGH SENSITIVITY: 17 NG/L (ref 0–22)
TSH SERPL DL<=0.005 MIU/L-ACNC: 0.39 UIU/ML (ref 0.27–4.2)
WBC # BLD AUTO: 10.3 K/UL (ref 4–11)
WBC # BLD AUTO: 10.9 K/UL (ref 4–11)

## 2023-11-30 PROCEDURE — 36415 COLL VENOUS BLD VENIPUNCTURE: CPT

## 2023-11-30 PROCEDURE — 6370000000 HC RX 637 (ALT 250 FOR IP): Performed by: NURSE PRACTITIONER

## 2023-11-30 PROCEDURE — 2580000003 HC RX 258

## 2023-11-30 PROCEDURE — 2580000003 HC RX 258: Performed by: NURSE PRACTITIONER

## 2023-11-30 PROCEDURE — 85610 PROTHROMBIN TIME: CPT

## 2023-11-30 PROCEDURE — 2060000000 HC ICU INTERMEDIATE R&B

## 2023-11-30 PROCEDURE — 85027 COMPLETE CBC AUTOMATED: CPT

## 2023-11-30 PROCEDURE — 6370000000 HC RX 637 (ALT 250 FOR IP)

## 2023-11-30 PROCEDURE — 83735 ASSAY OF MAGNESIUM: CPT

## 2023-11-30 PROCEDURE — 99222 1ST HOSP IP/OBS MODERATE 55: CPT | Performed by: NURSE PRACTITIONER

## 2023-11-30 PROCEDURE — 93005 ELECTROCARDIOGRAM TRACING: CPT

## 2023-11-30 PROCEDURE — 80048 BASIC METABOLIC PNL TOTAL CA: CPT

## 2023-11-30 PROCEDURE — 84443 ASSAY THYROID STIM HORMONE: CPT

## 2023-11-30 PROCEDURE — 6370000000 HC RX 637 (ALT 250 FOR IP): Performed by: PSYCHIATRY & NEUROLOGY

## 2023-11-30 PROCEDURE — 93010 ELECTROCARDIOGRAM REPORT: CPT | Performed by: INTERNAL MEDICINE

## 2023-11-30 PROCEDURE — 84484 ASSAY OF TROPONIN QUANT: CPT

## 2023-11-30 PROCEDURE — 99223 1ST HOSP IP/OBS HIGH 75: CPT

## 2023-11-30 PROCEDURE — 85025 COMPLETE CBC W/AUTO DIFF WBC: CPT

## 2023-11-30 PROCEDURE — 99231 SBSQ HOSP IP/OBS SF/LOW 25: CPT

## 2023-11-30 RX ORDER — WARFARIN SODIUM 2.5 MG/1
2.5 TABLET ORAL
Status: CANCELLED | OUTPATIENT
Start: 2023-11-30 | End: 2023-11-30

## 2023-11-30 RX ORDER — ACETAMINOPHEN 325 MG/1
650 TABLET ORAL EVERY 6 HOURS PRN
Status: CANCELLED | OUTPATIENT
Start: 2023-11-30

## 2023-11-30 RX ORDER — ONDANSETRON 2 MG/ML
4 INJECTION INTRAMUSCULAR; INTRAVENOUS EVERY 6 HOURS PRN
Status: CANCELLED | OUTPATIENT
Start: 2023-11-30

## 2023-11-30 RX ORDER — SODIUM CHLORIDE 0.9 % (FLUSH) 0.9 %
5-40 SYRINGE (ML) INJECTION EVERY 12 HOURS SCHEDULED
Status: CANCELLED | OUTPATIENT
Start: 2023-11-30

## 2023-11-30 RX ORDER — ACETAMINOPHEN 325 MG/1
650 TABLET ORAL EVERY 8 HOURS PRN
Status: DISCONTINUED | OUTPATIENT
Start: 2023-11-30 | End: 2023-11-30 | Stop reason: SDUPTHER

## 2023-11-30 RX ORDER — POLYETHYLENE GLYCOL 3350 17 G
2 POWDER IN PACKET (EA) ORAL
Status: CANCELLED | OUTPATIENT
Start: 2023-11-30

## 2023-11-30 RX ORDER — ACETAMINOPHEN 650 MG/1
650 SUPPOSITORY RECTAL EVERY 6 HOURS PRN
Status: DISCONTINUED | OUTPATIENT
Start: 2023-11-30 | End: 2023-12-01 | Stop reason: HOSPADM

## 2023-11-30 RX ORDER — SODIUM CHLORIDE 0.9 % (FLUSH) 0.9 %
5-40 SYRINGE (ML) INJECTION PRN
Status: CANCELLED | OUTPATIENT
Start: 2023-11-30

## 2023-11-30 RX ORDER — SODIUM CHLORIDE 9 MG/ML
INJECTION, SOLUTION INTRAVENOUS PRN
Status: CANCELLED | OUTPATIENT
Start: 2023-11-30

## 2023-11-30 RX ORDER — SODIUM CHLORIDE 0.9 % (FLUSH) 0.9 %
5-40 SYRINGE (ML) INJECTION PRN
Status: DISCONTINUED | OUTPATIENT
Start: 2023-11-30 | End: 2023-12-01 | Stop reason: HOSPADM

## 2023-11-30 RX ORDER — HYDROCHLOROTHIAZIDE 25 MG/1
25 TABLET ORAL DAILY
Status: DISCONTINUED | OUTPATIENT
Start: 2023-12-01 | End: 2023-12-01 | Stop reason: HOSPADM

## 2023-11-30 RX ORDER — HYDROXYZINE HYDROCHLORIDE 25 MG/1
25 TABLET, FILM COATED ORAL 3 TIMES DAILY PRN
Status: CANCELLED | OUTPATIENT
Start: 2023-11-30

## 2023-11-30 RX ORDER — LISINOPRIL 20 MG/1
40 TABLET ORAL DAILY
Status: DISCONTINUED | OUTPATIENT
Start: 2023-12-01 | End: 2023-11-30

## 2023-11-30 RX ORDER — ACETAMINOPHEN 325 MG/1
650 TABLET ORAL EVERY 8 HOURS PRN
Status: CANCELLED | OUTPATIENT
Start: 2023-11-30

## 2023-11-30 RX ORDER — SODIUM CHLORIDE 9 MG/ML
INJECTION, SOLUTION INTRAVENOUS CONTINUOUS
Status: DISCONTINUED | OUTPATIENT
Start: 2023-11-30 | End: 2023-12-01 | Stop reason: HOSPADM

## 2023-11-30 RX ORDER — SODIUM CHLORIDE 9 MG/ML
INJECTION, SOLUTION INTRAVENOUS PRN
Status: DISCONTINUED | OUTPATIENT
Start: 2023-11-30 | End: 2023-12-01 | Stop reason: HOSPADM

## 2023-11-30 RX ORDER — ACETAMINOPHEN 325 MG/1
650 TABLET ORAL EVERY 6 HOURS PRN
Status: DISCONTINUED | OUTPATIENT
Start: 2023-11-30 | End: 2023-12-01 | Stop reason: HOSPADM

## 2023-11-30 RX ORDER — LISINOPRIL 20 MG/1
20 TABLET ORAL DAILY
Status: DISCONTINUED | OUTPATIENT
Start: 2023-12-01 | End: 2023-12-01 | Stop reason: HOSPADM

## 2023-11-30 RX ORDER — ACETAMINOPHEN 650 MG/1
650 SUPPOSITORY RECTAL EVERY 6 HOURS PRN
Status: CANCELLED | OUTPATIENT
Start: 2023-11-30

## 2023-11-30 RX ORDER — POLYETHYLENE GLYCOL 3350 17 G
2 POWDER IN PACKET (EA) ORAL
Status: DISCONTINUED | OUTPATIENT
Start: 2023-11-30 | End: 2023-12-01 | Stop reason: HOSPADM

## 2023-11-30 RX ORDER — HYDROXYZINE HYDROCHLORIDE 25 MG/1
25 TABLET, FILM COATED ORAL 3 TIMES DAILY PRN
Status: DISCONTINUED | OUTPATIENT
Start: 2023-11-30 | End: 2023-12-01 | Stop reason: HOSPADM

## 2023-11-30 RX ORDER — LISINOPRIL 20 MG/1
40 TABLET ORAL DAILY
Status: CANCELLED | OUTPATIENT
Start: 2023-12-01

## 2023-11-30 RX ORDER — WARFARIN SODIUM 2.5 MG/1
2.5 TABLET ORAL
Status: DISCONTINUED | OUTPATIENT
Start: 2023-11-30 | End: 2023-11-30 | Stop reason: HOSPADM

## 2023-11-30 RX ORDER — ONDANSETRON 4 MG/1
4 TABLET, ORALLY DISINTEGRATING ORAL EVERY 8 HOURS PRN
Status: DISCONTINUED | OUTPATIENT
Start: 2023-11-30 | End: 2023-12-01 | Stop reason: HOSPADM

## 2023-11-30 RX ORDER — TRAZODONE HYDROCHLORIDE 50 MG/1
50 TABLET ORAL NIGHTLY PRN
Status: CANCELLED | OUTPATIENT
Start: 2023-11-30

## 2023-11-30 RX ORDER — SODIUM CHLORIDE 0.9 % (FLUSH) 0.9 %
5-40 SYRINGE (ML) INJECTION EVERY 12 HOURS SCHEDULED
Status: DISCONTINUED | OUTPATIENT
Start: 2023-11-30 | End: 2023-12-01 | Stop reason: HOSPADM

## 2023-11-30 RX ORDER — ONDANSETRON 4 MG/1
4 TABLET, ORALLY DISINTEGRATING ORAL EVERY 8 HOURS PRN
Status: CANCELLED | OUTPATIENT
Start: 2023-11-30

## 2023-11-30 RX ORDER — TRAZODONE HYDROCHLORIDE 50 MG/1
50 TABLET ORAL NIGHTLY PRN
Status: DISCONTINUED | OUTPATIENT
Start: 2023-11-30 | End: 2023-12-01 | Stop reason: HOSPADM

## 2023-11-30 RX ORDER — ONDANSETRON 2 MG/ML
4 INJECTION INTRAMUSCULAR; INTRAVENOUS EVERY 6 HOURS PRN
Status: DISCONTINUED | OUTPATIENT
Start: 2023-11-30 | End: 2023-12-01 | Stop reason: HOSPADM

## 2023-11-30 RX ORDER — WARFARIN SODIUM 2.5 MG/1
2.5 TABLET ORAL
Status: COMPLETED | OUTPATIENT
Start: 2023-11-30 | End: 2023-11-30

## 2023-11-30 RX ORDER — POLYETHYLENE GLYCOL 3350 17 G/17G
17 POWDER, FOR SOLUTION ORAL DAILY PRN
Status: CANCELLED | OUTPATIENT
Start: 2023-11-30

## 2023-11-30 RX ORDER — HYDROCHLOROTHIAZIDE 25 MG/1
25 TABLET ORAL DAILY
Status: CANCELLED | OUTPATIENT
Start: 2023-12-01

## 2023-11-30 RX ORDER — POLYETHYLENE GLYCOL 3350 17 G/17G
17 POWDER, FOR SOLUTION ORAL DAILY PRN
Status: DISCONTINUED | OUTPATIENT
Start: 2023-11-30 | End: 2023-12-01 | Stop reason: HOSPADM

## 2023-11-30 RX ADMIN — METOPROLOL TARTRATE 25 MG: 25 TABLET, FILM COATED ORAL at 22:33

## 2023-11-30 RX ADMIN — Medication 10 ML: at 22:33

## 2023-11-30 RX ADMIN — Medication 400 MG: at 08:56

## 2023-11-30 RX ADMIN — SODIUM CHLORIDE: 9 INJECTION, SOLUTION INTRAVENOUS at 14:49

## 2023-11-30 RX ADMIN — METOPROLOL TARTRATE 25 MG: 25 TABLET, FILM COATED ORAL at 08:56

## 2023-11-30 RX ADMIN — HYDROCHLOROTHIAZIDE 25 MG: 25 TABLET ORAL at 08:56

## 2023-11-30 RX ADMIN — ACETAMINOPHEN 650 MG: 325 TABLET ORAL at 15:43

## 2023-11-30 RX ADMIN — ACETAMINOPHEN 650 MG: 325 TABLET ORAL at 08:58

## 2023-11-30 RX ADMIN — WARFARIN SODIUM 2.5 MG: 2.5 TABLET ORAL at 17:54

## 2023-11-30 RX ADMIN — ACETAMINOPHEN 650 MG: 325 TABLET ORAL at 22:33

## 2023-11-30 RX ADMIN — LISINOPRIL 40 MG: 20 TABLET ORAL at 08:57

## 2023-11-30 ASSESSMENT — PAIN SCALES - GENERAL
PAINLEVEL_OUTOF10: 6
PAINLEVEL_OUTOF10: 3
PAINLEVEL_OUTOF10: 0

## 2023-11-30 ASSESSMENT — PAIN DESCRIPTION - ORIENTATION
ORIENTATION: LEFT

## 2023-11-30 ASSESSMENT — ENCOUNTER SYMPTOMS
GASTROINTESTINAL NEGATIVE: 1
RESPIRATORY NEGATIVE: 1

## 2023-11-30 ASSESSMENT — PAIN DESCRIPTION - DESCRIPTORS
DESCRIPTORS: ACHING

## 2023-11-30 ASSESSMENT — PAIN DESCRIPTION - LOCATION
LOCATION: ELBOW

## 2023-11-30 NOTE — PROGRESS NOTES
Pt HR elevated this morning. Perfect serve sent to Maurizio Lin. New order from [facility] CNP to recheck VS in one hour. VS rechecked at 1000. Pt continues to complain of left elbow pain and states he has a bruise on his left ribcage. Pt denies any injury or trauma to the area. [facility] NP aware.      Vitals:    11/30/23 1003   BP: (!) 92/55   Pulse: (!) 126   Resp: 16   Temp:    SpO2: 98%

## 2023-11-30 NOTE — CARE COORDINATION
Pt to DC to [facility] when medically stable. Not seen by CM.  Please alert CM if any DC or DME needs arise

## 2023-11-30 NOTE — PROGRESS NOTES
Pharmacy Note  Warfarin Consult  Dx: Afib  [id number] Score = 3 (Age x2, HTN)  Goal INR range 2-3   Home Warfarin dose: 5 mg on Mon/Wed/Fri; 2.5 mg on Sun/Tues/Thur/Sat  Also, will begin Lovenox bridge until therapeutic for >2 days per Sejal Julio NP. Date                 INR                  Warfarin  11/28              1.52                  7.5 mg  11/29              1.55                  7.5 mg  11/30              2.15                  2.5 mg    Recommend Warfarin 2.5 mg tonight x1. Daily INR ordered. Rx will continue to manage therapy per consult order.   Courtney Tobias, PharmD  11/30/2023 6:48 AM

## 2023-11-30 NOTE — PROGRESS NOTES
[facility]  Discharge Note    Pt discharged with followings belongings:   Dental Appliances: None  Vision - Corrective Lenses: Eyeglasses  Hearing Aid: None  Jewelry: Ring  Body Piercings Removed: No  Clothing: Footwear, Pants, Jacket/Coat, Mel, Sweater, Undergarments, Shirt, Socks  Other Valuables: Other (Comment) (4 Credit Cards)     Mental Status and Behavioral Exam  Normal: No  Level of Assistance: Independent/Self  Facial Expression: Sad  Affect: Appropriate  Level of Consciousness: Alert  Frequency of Checks: 4 times per hour, close  Mood:Normal: No  Mood: Sad  Motor Activity:Normal: No  Motor Activity: Decreased  Eye Contact: Good  Observed Behavior: Cooperative  Sexual Misconduct History: Current - no  Preception: [place] to person, [place] to time, [place] to place, [place] to situation  Attention:Normal: Yes  Thought Processes: Unremarkable  Thought Content:Normal: Yes  Thought Content: Other (comment)  Depression Symptoms: No problems reported or observed. Anxiety Symptoms: Generalized  Obdulia Symptoms: No problems reported or observed.   Hallucinations: None  Delusions: No  Memory:Normal: Yes  Memory: Other (comment)  Insight and Judgment: No  Insight and Judgment: Poor insight, Poor judgment      Metabolic Screening:    No results found for: \"LABA1C\"    No results found for: \"CHOL\"  No results found for: \"TRIG\"  No results found for: \"HDL\"  No components found for: \"LDLCAL\"  No results found for: \"LABVLDL\"    Daly Dorsey RN

## 2023-11-30 NOTE — PROGRESS NOTES
Patient complained of HA, requesting tylenol. Tylenol 650 mg PO given at 1543. Patient resting in bed, eyes closed, relaxed facial expression noted. No further complaints voiced at this time. Call light in reach.

## 2023-11-30 NOTE — PROGRESS NOTES
Pharmacy Note  Warfarin Consult  Dx: afib  Goal INR range 2-3   Home Warfarin dose:5mg MWF and 2.5mg on all other days  Pt transferred to PCU    Date  INR  Warfarin  11/28              1.52                  7.5mg  11/29               1.55                 7.5mg  11/30              2.15                  2.5mg  Recommend Warfarin 2.5mg tonight x1. Daily INR ordered. Rx will continue to manage therapy per consult order.   [address] [place] [address] 11/30/20231:18 PM  .

## 2023-11-30 NOTE — PROGRESS NOTES
11/30/23 1028   Encounter Summary   Encounter Overview/Reason  Volunteer Encounter   Service Provided For: Patient   Last Encounter  11/30/23  ([address] [place] [address])   Rituals, Rites and Sacraments   Type Mu-ism Communion   Assessment/Intervention/Outcome   Intervention Prayer (assurance of)/[place]

## 2023-11-30 NOTE — H&P
Hospital Medicine History & Physical      PCP: Andrew Cherry MD    Date of Admission: 11/30/2023    Date of Service: Pt seen/examined on 11/30/23      Chief Complaint:  afib RVR        History Of Present Illness: The patient is a 82 y.o. male with PMH of atrial fibrillation, CKD, HLD, HTN depression who presents to [facility] from Geriatric Psychiatry for atrial fibrillation with RVR. Pt was on the geriatric psych floor being treated for depression and HR noted to be increased. He did not take his meds, eat or drink well appp 5 days prior to admission to [facility]. Pt transferred to PCU for further care. Past Medical History:        Diagnosis Date    A-fib ([address])     CKD (chronic kidney disease)     HLD (hyperlipidemia)     Hypertension     Osteoarthritis     Skin cancer        Past Surgical History:        Procedure Laterality Date    SHOULDER SURGERY      TOTAL HIP ARTHROPLASTY         Medications Prior to Admission:    Prior to Admission medications    Medication Sig Start Date End Date Taking? Authorizing Provider   benazepril (LOTENSIN) 40 MG tablet Take 1 tablet by mouth daily    Victor M Craven MD   hydroCHLOROthiazide (HYDRODIURIL) 25 MG tablet Take 1 tablet by mouth daily    Victor M Craven MD   warfarin (COUMADIN) 5 MG tablet Take 1 tablet by mouth Patient takes 5mg on Mon, Wed and Friday. Half pill other days    Victor M Craven MD   metoprolol tartrate (LOPRESSOR) 25 MG tablet Take 1 tablet by mouth 2 times daily    Victor M Craven MD       Allergies:  Penicillins    Social History:  The patient currently lives home     TOBACCO:   reports that he has never smoked. He does not have any smokeless tobacco history on file. ETOH:   reports current alcohol use. Family History:   Positive as follows:    No family history on file.     REVIEW OF SYSTEMS:       Constitutional: Negative for fever   HENT: Negative for sore throat   Eyes: Negative for redness

## 2023-11-30 NOTE — PROGRESS NOTES
Telemetry Assignment Communication Form    Patient has orders for continuous telemetry OR pulse oximeter only orders    To be filled out by Clinical    Patient has Admission or Transfer orders and is assigned to Room Number: 328      (Once this top section is completed:  Select \"Route\" and send note to Fax number: [phone]))      ___________________________________________________________________________      To be filled out by [facility]    Patient assigned to tele box number: __________________               (to be written in by [facility] when telemetry box is assigned to patient)    ___________________________________________________________________________      Bedside RN confirming that the box listed above is in fact the telemetry box number being placed on the patient listed above.         X________________________________________ RN signature        __________________________________________RN assigned to Patient (please print)    _______________ Date    ____________ Time

## 2023-11-30 NOTE — PROGRESS NOTES
[facility]  Initial Interdisciplinary Treatment Plan NOTE    Review Date & Time: 11/30/2023 0900    Patient was not in treatment team    Admission Type:   Admission Type:  Involuntary    Reason for admission:  Reason for Admission: Depression      Estimated Length of Stay Update:  3-5 days  Estimated Discharge Date Update:   12/4/2023  EDUCATION:   Learner Progress Toward Treatment Goals: Reviewed results and recommendations of this team    Method: Individual    Outcome: Verbalized understanding    PATIENT GOALS: Get discharged    PLAN/TREATMENT RECOMMENDATIONS UPDATE:Stabilize and treat    GOALS UPDATE:   Time frame for Short-Term Goals: 3 days    Alla Bedolla RN

## 2023-11-30 NOTE — PLAN OF CARE
Problem: Risk for Elopement  Goal: Patient will not exit the unit/facility without proper excort  11/30/2023 1038 by Gee Steven RN  Outcome: Progressing     Problem: Self Harm/Suicidality  Goal: Will have no self-injury during hospital stay  Description: INTERVENTIONS:  1. Ensure constant observer at bedside with Q15M safety checks  2. Maintain a safe environment  3. Secure patient belongings  4. Ensure family/visitors adhere to safety recommendations  5. Ensure safety tray has been added to patient's diet order  6. Every shift and PRN: Re-assess suicidal risk via Frequent Screener    11/30/2023 1038 by Gee Steven RN  Outcome: Progressing    Pt has been visible on the unit. He denies SI/HI/AVH but does continue to state that he should have the right to die. Denies having a plan to harm self once discharged. Denies having a plan to harm self on the unit. He has had a good intake of food and fluids.

## 2023-11-30 NOTE — PLAN OF CARE
Problem: Risk for Elopement  Goal: Patient will not exit the unit/facility without proper excort  Outcome: Progressing     Problem: Self Harm/Suicidality  Goal: Will have no self-injury during hospital stay  Description: INTERVENTIONS:  1. Ensure constant observer at bedside with Q15M safety checks  2. Maintain a safe environment  3. Secure patient belongings  4. Ensure family/visitors adhere to safety recommendations  5. Ensure safety tray has been added to patient's diet order  6. Every shift and PRN: Re-assess suicidal risk via Frequent Screener    Outcome: Progressing     Problem: ABCDS Injury Assessment  Goal: Absence of physical injury  Outcome: Progressing     Problem: Coping  Goal: Pt/Family able to verbalize concerns and demonstrate effective coping strategies  Description: INTERVENTIONS:  1. Assist patient/family to identify coping skills, available support systems and cultural and spiritual values  2. Provide emotional support, including active listening and acknowledgement of concerns of patient and caregivers  3. Reduce environmental stimuli, as able  4. Instruct patient/family in relaxation techniques, as appropriate  5. Assess for spiritual pain/suffering and initiate Spiritual Care, Psychosocial Clinical Specialist consults as needed  Outcome: Progressing     Problem: Decision Making  Goal: Pt/Family able to effectively weigh alternatives and participate in decision making related to treatment and care  Description: INTERVENTIONS:  1. Determine when there are differences between patient's view, family's view, and healthcare provider's view of condition  2. Facilitate patient and family articulation of goals for care  3. Help patient and family identify pros/cons of alternative solutions  4. Provide information as requested by patient/family  5. Respect patient/family right to receive or not to receive information  6. Serve as a liaison between patient and family and health care team  7. Initiate Consults from Ethics, Palliative Care or initiate [phone] as is appropriate  Outcome: Progressing     Problem: Depression/Self Harm  Goal: Effect of psychiatric condition will be minimized and patient will be protected from self harm  Description: INTERVENTIONS:  1. Assess impact of patient's symptoms on level of functioning, self care needs and offer support as indicated  2. Assess patient/family knowledge of depression, impact on illness and need for teaching  3. Provide emotional support, presence and reassurance  4. Assess for possible suicidal thoughts or ideation. If patient expresses suicidal thoughts or statements do not leave alone, initiate Suicide Precautions, move to a room close to the nursing station and obtain sitter  5. Initiate consults as appropriate with Mental Health Professional, Spiritual Care, Psychosocial CNS, and consider a recommendation to the LIP for a Psychiatric Consultation  Outcome: Progressing     Problem: Involuntary Admit  Goal: Will cooperate with staff recommendations and doctor's orders and will demonstrate appropriate behavior  Description: INTERVENTIONS:  1. Treat underlying conditions and offer medication as ordered  2. Educate regarding involuntary admission procedures and rules  3.  Contain excessive/inappropriate behavior per unit and hospital policies  Outcome: Progressing     Problem: Safety - Adult  Goal: Free from fall injury  Outcome: Progressing

## 2023-12-01 ENCOUNTER — FOLLOWUP TELEPHONE ENCOUNTER (OUTPATIENT)
Dept: PSYCHIATRY | Age: 83
End: 2023-12-01

## 2023-12-01 VITALS
SYSTOLIC BLOOD PRESSURE: 122 MMHG | HEIGHT: 66 IN | WEIGHT: 175.49 LBS | BODY MASS INDEX: 28.2 KG/M2 | DIASTOLIC BLOOD PRESSURE: 73 MMHG | OXYGEN SATURATION: 97 % | TEMPERATURE: 97 F | HEART RATE: 92 BPM | RESPIRATION RATE: 17 BRPM

## 2023-12-01 LAB
ANION GAP SERPL CALCULATED.3IONS-SCNC: 8 MMOL/L (ref 3–16)
BASOPHILS # BLD: 0 K/UL (ref 0–0.2)
BASOPHILS NFR BLD: 0.4 %
BUN SERPL-MCNC: 21 MG/DL (ref 7–20)
CALCIUM SERPL-MCNC: 8.5 MG/DL (ref 8.3–10.6)
CHLORIDE SERPL-SCNC: 102 MMOL/L (ref 99–110)
CO2 SERPL-SCNC: 28 MMOL/L (ref 21–32)
CREAT SERPL-MCNC: 1.2 MG/DL (ref 0.8–1.3)
DEPRECATED RDW RBC AUTO: 14.3 % (ref 12.4–15.4)
EOSINOPHIL # BLD: 0.2 K/UL (ref 0–0.6)
EOSINOPHIL NFR BLD: 2.1 %
GFR SERPLBLD CREATININE-BSD FMLA CKD-EPI: >60 ML/MIN/{1.73_M2}
GLUCOSE SERPL-MCNC: 105 MG/DL (ref 70–99)
HCT VFR BLD AUTO: 41.4 % (ref 40.5–52.5)
HGB BLD-MCNC: 14 G/DL (ref 13.5–17.5)
INR PPP: 2.31 (ref 0.84–1.16)
LYMPHOCYTES # BLD: 1.9 K/UL (ref 1–5.1)
LYMPHOCYTES NFR BLD: 20.2 %
MCH RBC QN AUTO: 31.8 PG (ref 26–34)
MCHC RBC AUTO-ENTMCNC: 33.8 G/DL (ref 31–36)
MCV RBC AUTO: 94.1 FL (ref 80–100)
MONOCYTES # BLD: 1.4 K/UL (ref 0–1.3)
MONOCYTES NFR BLD: 14.5 %
NEUTROPHILS # BLD: 6 K/UL (ref 1.7–7.7)
NEUTROPHILS NFR BLD: 62.8 %
PLATELET # BLD AUTO: 169 K/UL (ref 135–450)
PMV BLD AUTO: 9 FL (ref 5–10.5)
POTASSIUM SERPL-SCNC: 3.6 MMOL/L (ref 3.5–5.1)
PROTHROMBIN TIME: 25.2 SEC (ref 11.5–14.8)
RBC # BLD AUTO: 4.4 M/UL (ref 4.2–5.9)
SODIUM SERPL-SCNC: 138 MMOL/L (ref 136–145)
WBC # BLD AUTO: 9.6 K/UL (ref 4–11)

## 2023-12-01 PROCEDURE — 6370000000 HC RX 637 (ALT 250 FOR IP)

## 2023-12-01 PROCEDURE — 99232 SBSQ HOSP IP/OBS MODERATE 35: CPT | Performed by: INTERNAL MEDICINE

## 2023-12-01 PROCEDURE — 2580000003 HC RX 258: Performed by: NURSE PRACTITIONER

## 2023-12-01 PROCEDURE — 6370000000 HC RX 637 (ALT 250 FOR IP): Performed by: INTERNAL MEDICINE

## 2023-12-01 PROCEDURE — 93306 TTE W/DOPPLER COMPLETE: CPT

## 2023-12-01 PROCEDURE — 85025 COMPLETE CBC W/AUTO DIFF WBC: CPT

## 2023-12-01 PROCEDURE — 80048 BASIC METABOLIC PNL TOTAL CA: CPT

## 2023-12-01 PROCEDURE — 85610 PROTHROMBIN TIME: CPT

## 2023-12-01 PROCEDURE — 99232 SBSQ HOSP IP/OBS MODERATE 35: CPT

## 2023-12-01 PROCEDURE — 99238 HOSP IP/OBS DSCHRG MGMT 30/<: CPT | Performed by: INTERNAL MEDICINE

## 2023-12-01 PROCEDURE — 36415 COLL VENOUS BLD VENIPUNCTURE: CPT

## 2023-12-01 RX ORDER — POTASSIUM CHLORIDE 750 MG/1
20 TABLET, EXTENDED RELEASE ORAL ONCE
Status: COMPLETED | OUTPATIENT
Start: 2023-12-01 | End: 2023-12-01

## 2023-12-01 RX ORDER — BENAZEPRIL HYDROCHLORIDE 40 MG/1
20 TABLET, FILM COATED ORAL DAILY
Qty: 30 TABLET | Refills: 3
Start: 2023-12-01

## 2023-12-01 RX ORDER — WARFARIN SODIUM 2.5 MG/1
2.5 TABLET ORAL
Status: DISCONTINUED | OUTPATIENT
Start: 2023-12-01 | End: 2023-12-01 | Stop reason: HOSPADM

## 2023-12-01 RX ADMIN — METOPROLOL TARTRATE 25 MG: 25 TABLET, FILM COATED ORAL at 10:25

## 2023-12-01 RX ADMIN — METOPROLOL TARTRATE 25 MG: 25 TABLET, FILM COATED ORAL at 09:45

## 2023-12-01 RX ADMIN — SODIUM CHLORIDE: 9 INJECTION, SOLUTION INTRAVENOUS at 04:25

## 2023-12-01 RX ADMIN — POTASSIUM CHLORIDE 20 MEQ: 750 TABLET, EXTENDED RELEASE ORAL at 16:22

## 2023-12-01 NOTE — PROGRESS NOTES
Pharmacy Note  Warfarin Consult  Dx: afib  Goal INR range 2-3   Home Warfarin dose:5mg MWF and 2.5mg on all other days  Pt transferred to PCU     Date                 INR                  Warfarin  11/28              1.52                  7.5 mg  11/29              1.55                  7.5 mg  11/30              2.15                  2.5 mg  12/1                2.31                  2.5 mg    Recommend Warfarin 2.5mg tonight x1. Daily INR ordered. Rx will continue to manage therapy per consult order.   Tierney Doshi, PharmD  12/1/2023 6:10 AM

## 2023-12-01 NOTE — FLOWSHEET NOTE
12/01/23 0720   Handoff   Communication Given Shift Handoff   Handoff Given To DEIDRA Sam   Handoff Received From Chaya Barr RN   Handoff Communication Face to Face   Time Handoff Given 7987   End of Shift Check Performed Yes     EOS report given to Andrew Olivo RN. Pt in bed, call light within reach. Pt is stable, care is transferred.

## 2023-12-01 NOTE — DISCHARGE SUMMARY
Name:  Nahum Levy  Room:  /0572-27  MRN:    [id number]    Discharge Summary      This discharge summary is in conjunction with a complete physical exam done on the day of discharge. Discharging Physician: Dr. Jesus Dalton: 11/30/2023  Discharge: 12/01/23      HPI taken from admission H&P:    The patient is a 82 y.o. male with PMH of atrial fibrillation, CKD, HLD, HTN depression who presents to [facility] from Geriatric Psychiatry for atrial fibrillation with RVR. Pt was on the geriatric psych floor being treated for depression and HR noted to be increased. He did not take his meds, eat or drink well appp 5 days prior to admission to [facility]. Pt transferred to PCU for further care.        Diagnoses this Admission and Hospital Course   #Afib RVR  - secondary hypercoagulable state, AC on warfarin -> pharmacy to dose   - patient was without medications for several days before admission   - EKG on admit HRs 120s, had resolved to 80s after continuing home lopressor BID  - HR continues to be uncontrolled, up to 120s this am   - plan to transfer to medical for further management with Cardiology consult   - echo as below  - monitor on telemetry   - HR 70s-80s today      #Hypokalemia  #Hypomagnesemia   - likely 2/2 poor PO intake prior to admission   - repeat labs   - magnesium replacement yesterday and today  - monitor BMP     #HTN- currently on the lower side  - one hctz, benazepril, lopressor  - hold HCTZ -> stop on dc  - reduce benazepril to 20 mg at this time and monitor   - monitor BP, currently soft   - IVF and monitor      #HLD  - not on statin     #CKD3a  - creatinine at baseline  - avoid nephrotoxic agents      #Suicidal Ideation  #Depression   - per psychiatry team  - no sitter needed per psychiatry while on medical floor  - follow up with psychiatry outpt     Procedures (Please Review Full Report for Details)  None     Consults    Psychiatry   Cardiology     Physical Exam at Discharge:

## 2023-12-01 NOTE — PROGRESS NOTES
12/01/23 1416   Encounter Summary   Encounter Overview/Reason  Volunteer Encounter   Last Encounter  12/01/23  ([address] [place] [address])   Assessment/Intervention/Outcome   Intervention Prayer (assurance of)/[place]

## 2023-12-01 NOTE — PROGRESS NOTES
fibrillationleft axisLeft anterior fascicular blockWhen compared with ECG of 28-NOV-2023 21:15,HR decreasedConfirmed by Mayi Beltran MD, Fletcher ([id number]) on 11/29/2023 4:25:25 PM   Final    Protime 11/30/2023 23.9 (H)  11.5 - 14.8 sec Final    Comment: Effective 3-28-23 09:00am EST  Please note reference ranges have  changed for PT and INR Testing. INR 11/30/2023 2.15 (H)  0.84 - 1.16 Final    Comment: Effective 3/28/23 at 09:00am EST    Normal: 0.84 - 1.16  Therapeutic: 2.0 - 3.0  Pros.  Valve: 2.5 - 3.5  AMI: 2.0 - 3.0      WBC 11/30/2023 10.3  4.0 - 11.0 K/uL Final    RBC 11/30/2023 4.51  4.20 - 5.90 M/uL Final    Hemoglobin 11/30/2023 14.6  13.5 - 17.5 g/dL Final    Hematocrit 11/30/2023 42.8  40.5 - 52.5 % Final    MCV 11/30/2023 95.0  80.0 - 100.0 fL Final    MCH 11/30/2023 32.4  26.0 - 34.0 pg Final    MCHC 11/30/2023 34.2  31.0 - 36.0 g/dL Final    RDW 11/30/2023 14.0  12.4 - 15.4 % Final    Platelets 64/03/7847 169  135 - 450 K/uL Final    MPV 11/30/2023 8.7  5.0 - 10.5 fL Final    Ventricular Rate 11/30/2023 89  BPM Final    Atrial Rate 11/30/2023 90  BPM Final    QRS Duration 11/30/2023 86  ms Final    Q-T Interval 11/30/2023 340  ms Final    QTc Calculation (Bazett) 11/30/2023 413  ms Final    R Axis 11/30/2023 -49  degrees Final    T Axis 11/30/2023 -13  degrees Final    Diagnosis 11/30/2023 Atrial fibrillationleft axisLeft anterior fascicular blockNonspecific ST abnormalityAbnormal ECGWhen compared with ECG of 29-NOV-2023 11:04,No significant change was foundConfirmed by Mayi Beltran MD REGLA ([id number]) on 11/30/2023 11:38:21 AM   Final            Medications  Current Facility-Administered Medications: warfarin (COUMADIN) tablet 2.5 mg, 2.5 mg, Oral, Once  metoprolol tartrate (LOPRESSOR) tablet 25 mg, 25 mg, Oral, BID  nicotine polacrilex (COMMIT) lozenge 2 mg, 2 mg, Oral, Q1H PRN  traZODone (DESYREL) tablet 50 mg, 50 mg, Oral, Nightly PRN  warfarin placeholder: dosing by pharmacy, , Other, RX Placeholder  hydrOXYzine HCl (ATARAX) tablet 25 mg, 25 mg, Oral, TID PRN  [Held by provider] hydroCHLOROthiazide (HYDRODIURIL) tablet 25 mg, 25 mg, Oral, Daily  camphor-menthol-methyl salicylate (BENGAY ULTRA STRENGTH) 4-10-30 % cream, , Apply externally, TID PRN  perflutren lipid microspheres (DEFINITY) injection 1.5 mL, 1.5 mL, IntraVENous, ONCE PRN  sodium chloride flush 0.9 % injection 5-40 mL, 5-40 mL, IntraVENous, 2 times per day  sodium chloride flush 0.9 % injection 5-40 mL, 5-40 mL, IntraVENous, PRN  0.9 % sodium chloride infusion, , IntraVENous, PRN  ondansetron (ZOFRAN-ODT) disintegrating tablet 4 mg, 4 mg, Oral, Q8H PRN **OR** ondansetron (ZOFRAN) injection 4 mg, 4 mg, IntraVENous, Q6H PRN  polyethylene glycol (GLYCOLAX) packet 17 g, 17 g, Oral, Daily PRN  acetaminophen (TYLENOL) tablet 650 mg, 650 mg, Oral, Q6H PRN **OR** acetaminophen (TYLENOL) suppository 650 mg, 650 mg, Rectal, Q6H PRN  0.9 % sodium chloride infusion, , IntraVENous, Continuous  lisinopril (PRINIVIL;ZESTRIL) tablet 20 mg, 20 mg, Oral, Daily    ASSESSMENT AND PLAN    Principal Problem:    Atrial fibrillation with RVR (HCC)  Resolved Problems:    * No resolved hospital problems. *       1. Patient's symptoms   are improving  2. Probable discharge is today  3. Discharge planning is incomplete  4. Suicidal ideation is better  5. Total time with patient was 40 minutes and more than 50 % of that time was spent counseling the patient on their symptoms, treatment and expected goals.      Bryan Ventura, PMHNP-BC

## 2023-12-01 NOTE — FLOWSHEET NOTE
11/30/23 1922   Handoff   Communication Given Shift Handoff   Handoff Given To [employer] Received From [facility] to Face; At bedside   Time Handoff Given 1920   End of Shift Check Performed Yes

## 2023-12-01 NOTE — PROGRESS NOTES
Placed order for referral for social work. Pt's wife passed 2yrs ago-still grieving. Pt's daughter is disabled-paralyzed and needs full care. Just moved in with him and he in unsure how to help or get help and is feeling overwhelmed.

## 2023-12-01 NOTE — PLAN OF CARE
Problem: Discharge Planning  Goal: Discharge to home or other facility with appropriate resources  12/1/2023 1115 by Martin Al RN  Outcome: Adequate for Discharge  12/1/2023 1115 by Martin Al RN  Outcome: Progressing  11/30/2023 2211 by Mellisa Boswell RN  Outcome: Progressing     Problem: Self Harm/Suicidality  Goal: Will have no self-injury during hospital stay  Description: INTERVENTIONS:  1. Ensure constant observer at bedside with Q15M safety checks  2. Maintain a safe environment  3. Secure patient belongings  4. Ensure family/visitors adhere to safety recommendations  5. Ensure safety tray has been added to patient's diet order  6.   Every shift and PRN: Re-assess suicidal risk via Frequent Screener    12/1/2023 1115 by Martin Al RN  Outcome: Adequate for Discharge  11/30/2023 2211 by Mellisa Boswell RN  Outcome: Progressing     Problem: Pain  Goal: Verbalizes/displays adequate comfort level or baseline comfort level  12/1/2023 1115 by Martin Al RN  Outcome: Adequate for Discharge  11/30/2023 2211 by Mellisa Boswell RN  Outcome: Progressing     Problem: Safety - Adult  Goal: Free from fall injury  Outcome: Adequate for Discharge

## 2023-12-02 NOTE — PROGRESS NOTES
Patient educated on discharge instructions as well as new medications use, dosage, administration and possible side effects. Patient verified knowledge. IV removed without difficulty and dry dressing in place. Telemetry monitor removed and returned to [facility]. Pt left facility in stable condition to Home with all of their personal belongings.

## 2024-01-02 NOTE — CONSULTS
Problem: Respiratory - Adult  Goal: Achieves optimal ventilation and oxygenation  Outcome: Progressing  Flowsheets (Taken 1/2/2024 0928)  Achieves optimal ventilation and oxygenation:   Assess for changes in respiratory status   Respiratory therapy support as indicated   Assess for changes in mentation and behavior   Assess and instruct to report shortness of breath or any respiratory difficulty   Encourage broncho-pulmonary hygiene including cough, deep breathe, incentive spirometry      Psychiatric Consult     Admit Date:  2023    Consult Date:  2023     Reason for Consult: Depression    Summary Present Illness: Patient is a 82 y.o. male who presents  to [facility] ED on a SOB after pt was brought in by police. Per SW notes: \"Per SOB police responded to pt's place of residence in response to a missing person. Per SOB pt had made suicidal statements and told the  he wanted to drive his car into a river. Pt later recanted his statement to police and stated he no linger felt this way. Pt was in possession of a fire arm while he missing but told police officers he threw it into a river while traveling. Pt reports he went out  morning to get money from the [employer] at the bank and was going to go home. Pt states while he was out he had a spur of the moment thought and decided to drive off to find a place to kill himself with the firearm. Pt reports he drove all over and ended up in [place] as well. Pt reports he has been depressed since his wife  six years ago. He reports he has not been the same since. He reports  he has financial stress and states he will always help others but no one will help him. He reports he feels he is drowning in debt. He reports his daughter and grandson live with him and states his daughter is paralyzed. He reports he thought by killing himself all of his problems would go away and be solved. He reports he has nothing worth living for and does not enjoy anything he used to do. He reports he does not do much of anything anymore beside go to [employer] and the grocery store. He reports he also left his cell phone at the house when he left. He reports he was being selfish and does not plan on acting on these thoughts again. He reports he does want to die because he wants to be with his wife. He denies a plan or intent. Pt states he will let nature takes it course.  He reports he does not want to live long and is surprised he has lived for this for PT and INR Testing. INR 11/28/2023 1.52 (H)  0.84 - 1.16 Final    Comment: Effective 3/28/23 at 09:00am EST    Normal: 0.84 - 1.16  Therapeutic: 2.0 - 3.0  Pros. Valve: 2.5 - 3.5  AMI: 2.0 - 3.0      Protime 11/29/2023 18.5 (H)  11.5 - 14.8 sec Final    Comment: Effective 3-28-23 09:00am EST  Please note reference ranges have  changed for PT and INR Testing. INR 11/29/2023 1.55 (H)  0.84 - 1.16 Final    Comment: Effective 3/28/23 at 09:00am EST    Normal: 0.84 - 1.16  Therapeutic: 2.0 - 3.0  Pros. Valve: 2.5 - 3.5  AMI: 2.0 - 3.0      WBC 11/29/2023 9.8  4.0 - 11.0 K/uL Final    RBC 11/29/2023 5.12  4.20 - 5.90 M/uL Final    Hemoglobin 11/29/2023 16.1  13.5 - 17.5 g/dL Final    Hematocrit 11/29/2023 48.3  40.5 - 52.5 % Final    MCV 11/29/2023 94.3  80.0 - 100.0 fL Final    MCH 11/29/2023 31.4  26.0 - 34.0 pg Final    MCHC 11/29/2023 33.3  31.0 - 36.0 g/dL Final    RDW 11/29/2023 14.3  12.4 - 15.4 % Final    Platelets 98/96/2668 203  135 - 450 K/uL Final    MPV 11/29/2023 8.8  5.0 - 10.5 fL Final    Potassium reflex Magnesium 11/29/2023 3.5  3.5 - 5.1 mmol/L Final    Magnesium 11/29/2023 1.70 (L)  1.80 - 2.40 mg/dL Final    Ventricular Rate 11/29/2023 88  BPM Final    Atrial Rate 11/29/2023 101  BPM Final    QRS Duration 11/29/2023 94  ms Final    Q-T Interval 11/29/2023 360  ms Final    QTc Calculation (Bazett) 11/29/2023 435  ms Final    R Axis 11/29/2023 -67  degrees Final    T Axis 11/29/2023 -21  degrees Final    Diagnosis 11/29/2023 Atrial fibrillationleft axisLeft anterior fascicular blockWhen compared with ECG of 28-NOV-2023 21:15,HR decreasedConfirmed by REGLA Vasquez MD ([id number]) on 11/29/2023 4:25:25 PM   Final    Protime 11/30/2023 23.9 (H)  11.5 - 14.8 sec Final    Comment: Effective 3-28-23 09:00am EST  Please note reference ranges have  changed for PT and INR Testing.       INR 11/30/2023 2.15 (H)  0.84 - 1.16 Final    Comment: Effective 3/28/23 at 09:00am EST    Normal: 0.84 -

## 2025-01-08 NOTE — FLOWSHEET NOTE
Purposeful Rounding    Patient concerns reported: Patient has complaints of elbow pain.      Nurse made aware:yes    Patient Turned and repositioned: Independent    Patient Toileted: Continent    Fall Precautions in Place: Yellow non-skid socks on, Bed locked in low position, Lighting appropriate, Room free of clutter, and Clear path to bathroom      Electronically signed by Jonathan Cazares on 11/29/23 at 9:14 PM EST
Comment: Back, 1988
Render Risk Assessment In Note?: no
Detail Level: Simple
Comment: Not painful per patient, no treatment necessary at this time.
Comment: FBSE performed. HC of BCC w/ NER. Other benign findings noted. SPF and proper sun protection encouraged. F/u 1yr.